# Patient Record
Sex: MALE | NOT HISPANIC OR LATINO | Employment: FULL TIME | URBAN - METROPOLITAN AREA
[De-identification: names, ages, dates, MRNs, and addresses within clinical notes are randomized per-mention and may not be internally consistent; named-entity substitution may affect disease eponyms.]

---

## 2021-05-03 ENCOUNTER — TRANSFERRED RECORDS (OUTPATIENT)
Dept: HEALTH INFORMATION MANAGEMENT | Facility: CLINIC | Age: 46
End: 2021-05-03

## 2022-01-25 ENCOUNTER — LAB (OUTPATIENT)
Dept: LAB | Facility: CLINIC | Age: 47
End: 2022-01-25
Attending: INTERNAL MEDICINE
Payer: COMMERCIAL

## 2022-01-25 DIAGNOSIS — E34.52 INFERTILE MALE SYNDROME: Primary | ICD-10-CM

## 2022-01-25 PROCEDURE — 89310 SEMEN ANALYSIS W/COUNT: CPT

## 2022-01-26 LAB
ABSTINENCE DAYS: 3 DAYS (ref 2–7)
AGGLUTINATION: ABNORMAL
ANALYSIS TEMP - CENTIGRADE: 23 CENTIGRADE
COLLECTION METHOD: ABNORMAL
COLLECTION SITE: ABNORMAL
CONSENT TO RELEASE TO PARTNER: YES
DAL- RECEIVED TIME: ABNORMAL
IMMOTILE: 0 %
LIQUEFIED: YES
NON-PROGRESSIVE MOTILITY: 0 %
PROGRESSIVE MOTILITY: 0 %
ROUND CELLS: 0 MILLION/ML
SPECIMEN PH: 7.6 PH
SPECIMEN VOLUME: 0.3 ML
SPERM CONCENTRATION: 0 MILLION/ML
TIME OF ANALYSIS: ABNORMAL
TOTAL PROGRESSIVE MOTILE NUMBER: 0 MILLION
TOTAL SPERM NUMBER: 0 MILLION
VISCOUS: NO

## 2022-08-29 ENCOUNTER — MEDICAL CORRESPONDENCE (OUTPATIENT)
Dept: HEALTH INFORMATION MANAGEMENT | Facility: CLINIC | Age: 47
End: 2022-08-29

## 2022-09-12 ENCOUNTER — TRANSCRIBE ORDERS (OUTPATIENT)
Dept: OTHER | Age: 47
End: 2022-09-12

## 2022-09-12 DIAGNOSIS — E34.52 INFERTILE MALE SYNDROME: ICD-10-CM

## 2022-09-12 DIAGNOSIS — E29.1 HYPOGONADISM MALE: Primary | ICD-10-CM

## 2022-09-22 ENCOUNTER — TELEPHONE (OUTPATIENT)
Dept: UROLOGY | Facility: CLINIC | Age: 47
End: 2022-09-22

## 2022-09-22 DIAGNOSIS — Z31.41 FERTILITY TESTING: Primary | ICD-10-CM

## 2022-09-22 NOTE — TELEPHONE ENCOUNTER
[facility] Call Center    Phone Message    May a detailed message be left on voicemail: yes     Reason for Call: Patient is coming to see Dr. Asher for infertility. Patient has SA done in January 2022 and its in his chart. Please reach out if needed to be repeated. Thank you    Action Taken: Message routed to:  [facility] ([facility]): Uro    Travel Screening: Not Applicable

## 2022-09-23 NOTE — TELEPHONE ENCOUNTER
Spoke to pt. Confirmed that pt had last SA lab done in January. Informed that it needs to be updated. Pt states that he is unable to get an erection and won't be able to produce sample. Advised to discuss with Dr. Asher at upcoming appointment. Pt had Testosterone level done on 8/26/22, able to see in care everywhere. Needs FSH and estradiol levels done. Orders placed and pt agrees to schedule lab for morning appointment. No other questions noted.     Karen Lopez RN MSN

## 2022-09-28 DIAGNOSIS — Z31.41 FERTILITY TESTING: Primary | ICD-10-CM

## 2022-09-28 NOTE — TELEPHONE ENCOUNTER
Action 2022 JTV 7:13 PM    Action Taken CSS sent a message to Rooming Staff to see if updated labs are needed.     MEDICAL RECORDS REQUEST   [facility]  [address] [place] [address]  PHONE: [phone]  Fax: [phone]        FUTURE VISIT INFORMATION                                                   Lloyd Fu, : 1975 scheduled for future visit at [facility]    APPOINTMENT INFORMATION:    Date: 2022    Provider:  Thomas Asher MD    Reason for Visit/Diagnosis: infertility    REFERRAL INFORMATION:    Referring provider:  Dr Lucas Birmingham @ [facility] contact number:  [phone] (Work), [phone] (Fax)     RECORDS REQUESTED FOR VISIT                                                     NOTES  STATUS/DETAILS   Telephone NOTE from referring provider  yes, 2022 -- Lucas Birmingham MD  @    MEDICATION LIST  yes, Care Everywhere   INFERTILITY     ALBUMIN  yes, 2021   FSH  yes, 2021   LH  yes, 2021   SEMEN ANALYSIS (LAST 2)  yes, 2022   T  yes, 2022,      PRE-VISIT CHECKLIST      Record collection complete YES   Appointment appropriately scheduled           (right time/right provider) Yes   Joint diagnostic appointment coordinated correctly          (ensure right order & amount of time) Yes   MyChart activation If no, please explain pending   Questionnaire complete If no, please explain pending

## 2022-11-10 ENCOUNTER — TELEPHONE (OUTPATIENT)
Dept: UROLOGY | Facility: CLINIC | Age: 47
End: 2022-11-10

## 2022-11-10 NOTE — TELEPHONE ENCOUNTER
YANETH and callback to schedule SA at [facility] and the lab before his appt on 12/1

## 2022-11-11 ENCOUNTER — TELEPHONE (OUTPATIENT)
Dept: UROLOGY | Facility: CLINIC | Age: 47
End: 2022-11-11

## 2022-11-11 NOTE — TELEPHONE ENCOUNTER
LVM and callback numbers for LATA and scheduling to set up SA and blood lab prior to 12/1 visit with Dr Asher

## 2022-11-28 ENCOUNTER — TELEPHONE (OUTPATIENT)
Dept: UROLOGY | Facility: CLINIC | Age: 47
End: 2022-11-28

## 2022-11-29 ENCOUNTER — TELEPHONE (OUTPATIENT)
Dept: UROLOGY | Facility: CLINIC | Age: 47
End: 2022-11-29

## 2022-11-30 ENCOUNTER — TELEPHONE (OUTPATIENT)
Dept: UROLOGY | Facility: CLINIC | Age: 47
End: 2022-11-30

## 2022-11-30 NOTE — TELEPHONE ENCOUNTER
LVM and callback.  Pt's appt with Dr Asher on 12/1 is cancelled and he needs to get SA and lab work completed prior to visit.  He then needs to reschedule appt.

## 2022-12-01 ENCOUNTER — PRE VISIT (OUTPATIENT)
Dept: UROLOGY | Facility: CLINIC | Age: 47
End: 2022-12-01

## 2023-10-13 ENCOUNTER — HOSPITAL ENCOUNTER (EMERGENCY)
Facility: CLINIC | Age: 48
Discharge: LEFT BEFORE DISCHARGE | End: 2023-10-13
Admitting: EMERGENCY MEDICINE
Payer: COMMERCIAL

## 2023-10-13 VITALS
WEIGHT: 240 LBS | OXYGEN SATURATION: 100 % | BODY MASS INDEX: 33.6 KG/M2 | RESPIRATION RATE: 18 BRPM | TEMPERATURE: 98.1 F | SYSTOLIC BLOOD PRESSURE: 135 MMHG | DIASTOLIC BLOOD PRESSURE: 106 MMHG | HEART RATE: 78 BPM | HEIGHT: 71 IN

## 2023-10-13 PROCEDURE — 99281 EMR DPT VST MAYX REQ PHY/QHP: CPT

## 2023-10-14 NOTE — ED TRIAGE NOTES
Pt here for severe back pain x4 days and numbness and pain down LLE. Pt has hx of back surgery 2 years ago. Last dose of ibuprofen about 2 hours ago.

## 2024-11-22 ENCOUNTER — LAB (OUTPATIENT)
Dept: LAB | Facility: CLINIC | Age: 49
End: 2024-11-22
Payer: COMMERCIAL

## 2024-11-22 DIAGNOSIS — N46.01 AZOOSPERMIA: ICD-10-CM

## 2024-11-22 LAB
ANALYSIS TEMP - CENTIGRADE: 37 CENTIGRADE
COLLECTION METHOD: NORMAL
COLLECTION SITE: NORMAL
DAL- RECEIVED TIME: NORMAL
TESE NARRATIVE: NORMAL
TIME OF ANALYSIS: NORMAL

## 2024-11-22 PROCEDURE — 89260 SPERM ISOLATION SIMPLE: CPT
